# Patient Record
(demographics unavailable — no encounter records)

---

## 2025-06-10 NOTE — HISTORY OF PRESENT ILLNESS
[FreeTextEntry1] : annual physical, LBP, bruising, weight gain [de-identified] : LETI ROMO is a 39 year y/o F with PMH of uterine fibroids who presents as a new patient today to establish care. CC LBP, left side  #Low Back Pain Has seen sports medicine and back specialist Back specialist wanted to get an MRI but she had some other health things going on MRI authorization lapsed, needs a new one No sciatica, no weakness, numbness, or tingling Bothers her the most while laying down at night when changing positions Hard to lean forward especially when sitting for a while Started early 2023 Initially got XR on back, thinks she has access to it Last provider was spring 2024 and recommended PT after MRI results Would really like to know what is going on so that she can plan accordingly  #Weight Gain Pt has been steadily gaining weight Gets 10k+ steps per day Shares FHx of DM Resting weight was previously in 160s Has gained about 30 lbs over the past 2 years Turning 40 this year and wants to feel better in her body Is open to trying GLP-1  #Bruise on Left Thigh Has been there for a month Not painful Notes that mom bruises easily Sometimes bruises when bit by mosquitos Doesn't hurt Has not changed at all since it appeared Wants to know if it is something to be concerned about.    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

## 2025-06-10 NOTE — HEALTH RISK ASSESSMENT
[Yes] : Yes [2 - 3 times a week (3 pts)] : 2 - 3  times a week (3 points) [1 or 2 (0 pts)] : 1 or 2 (0 points) [Never (0 pts)] : Never (0 points) [0] : 2) Feeling down, depressed, or hopeless: Not at all (0) [PHQ-2 Negative - No further assessment needed] : PHQ-2 Negative - No further assessment needed [Patient reported PAP Smear was normal] : Patient reported PAP Smear was normal [HIV test declined] : HIV test declined [Never] : Never [NO] : No [PapSmearComments] : no h/o abn, follows with Gyn

## 2025-06-10 NOTE — HISTORY OF PRESENT ILLNESS
[FreeTextEntry1] : annual physical, LBP, bruising, weight gain [de-identified] : LETI ROMO is a 39 year y/o F with PMH of uterine fibroids who presents as a new patient today to establish care. CC LBP, left side  #Low Back Pain Has seen sports medicine and back specialist Back specialist wanted to get an MRI but she had some other health things going on MRI authorization lapsed, needs a new one No sciatica, no weakness, numbness, or tingling Bothers her the most while laying down at night when changing positions Hard to lean forward especially when sitting for a while Started early 2023 Initially got XR on back, thinks she has access to it Last provider was spring 2024 and recommended PT after MRI results Would really like to know what is going on so that she can plan accordingly  #Weight Gain Pt has been steadily gaining weight Gets 10k+ steps per day Shares FHx of DM Resting weight was previously in 160s Has gained about 30 lbs over the past 2 years Turning 40 this year and wants to feel better in her body Is open to trying GLP-1  #Bruise on Left Thigh Has been there for a month Not painful Notes that mom bruises easily Sometimes bruises when bit by mosquitos Doesn't hurt Has not changed at all since it appeared Wants to know if it is something to be concerned about.    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

## 2025-06-10 NOTE — PHYSICAL EXAM
[No Acute Distress] : no acute distress [Well-Appearing] : well-appearing [Non Tender] : non-tender [Non-distended] : non-distended [Normal Bowel Sounds] : normal bowel sounds [Coordination Grossly Intact] : coordination grossly intact [Normal Gait] : normal gait [Normal] : affect was normal and insight and judgment were intact [de-identified] : area of hyperpigmentation of L lateral thigh

## 2025-06-10 NOTE — ASSESSMENT
[Vaccines Reviewed] : Immunizations reviewed today. Please see immunization details in the vaccine log within the immunization flowsheet.  [FreeTextEntry1] : #LBP - Will order MRI after reviewing XR report - PT referral  #Hyperpigmentation - Derm referral  #BMI 32 #Weight Gain - F/u 1-2 weeks to discuss next steps  #HM Due for tdap UTD on pap Eligible for gardasil Does not need STI screening today Will do non-fasting bloodwork today Planning for IVF in the future - Bloodwork today - Vaccine appt for tdap - Get records - F/u 1 year, sooner if needed  I have spent 24 minutes of time on the encounter which excludes time spent of the preventive visit and separately reported services.

## 2025-06-10 NOTE — HEALTH RISK ASSESSMENT
[Yes] : Yes [1 or 2 (0 pts)] : 1 or 2 (0 points) [2 - 3 times a week (3 pts)] : 2 - 3  times a week (3 points) [Never (0 pts)] : Never (0 points) [0] : 2) Feeling down, depressed, or hopeless: Not at all (0) [PHQ-2 Negative - No further assessment needed] : PHQ-2 Negative - No further assessment needed [Patient reported PAP Smear was normal] : Patient reported PAP Smear was normal [HIV test declined] : HIV test declined [Never] : Never [NO] : No [PapSmearComments] : no h/o abn, follows with Gyn

## 2025-06-10 NOTE — PHYSICAL EXAM
[No Acute Distress] : no acute distress [Well-Appearing] : well-appearing [Non-distended] : non-distended [Non Tender] : non-tender [Normal Bowel Sounds] : normal bowel sounds [Normal Gait] : normal gait [Coordination Grossly Intact] : coordination grossly intact [Normal] : affect was normal and insight and judgment were intact [de-identified] : area of hyperpigmentation of L lateral thigh

## 2025-06-24 NOTE — HISTORY OF PRESENT ILLNESS
[FreeTextEntry1] : results review, abn CBC, elevated BP, back pain. discuss GLP-1 [de-identified] : LETI ROMO is a 39 year y/o F who presents for follow-up today. CC results review, abn CBC, elevated BP, back pain. discuss GLP-1  #BMI 32 #HLD Has friends who have benefited from GLP-1 medications and has seen it improve their quality of life.  Pt has no personal or family history of pancreatitis, MEN2, or medullary thyroid carcinoma. We discussed side effects, doses, and timeline, as well as the possibility of this being a long-term medication. Has fertility appt upcoming, will discuss GLP-1 with egg freezing  #Low Platelets We reviewed CBC and discuss next steps, will plan to Recheck CBC  #Elevated BP We discussed proper BP measurement and home measurement if BP remains elevated  #Back Pain will order MRI with XR results

## 2025-06-24 NOTE — PHYSICAL EXAM
[No Acute Distress] : no acute distress [Well-Appearing] : well-appearing [Normal Sclera/Conjunctiva] : normal sclera/conjunctiva [EOMI] : extraocular movements intact [Coordination Grossly Intact] : coordination grossly intact [Normal Gait] : normal gait [Speech Grossly Normal] : speech grossly normal [Normal Mood] : the mood was normal [Normal] : affect was normal and insight and judgment were intact [Normal Outer Ear/Nose] : the outer ears and nose were normal in appearance [No JVD] : no jugular venous distention [No Respiratory Distress] : no respiratory distress  [No Accessory Muscle Use] : no accessory muscle use

## 2025-06-24 NOTE — ASSESSMENT
[FreeTextEntry1] : #BMI 32 #HLD Pt has no personal or family history of pancreatitis, MEN2, or medullary thyroid carcinoma. We discussed side effects, doses, and timeline, as well as the possibility of this being a long-term medication. All questions answered - Start Zepbound 2.5 mg subQ weekly x 4 weeks - Dietician referral - F/u with Fertility provider about GLP-1 and egg retrieval - Continue healthy eating including adequate protein intake - Continue exercise to help build and maintain muscle mass - F/u in 4 weeks to discuss dose increase  #Abn CBC We reviewed bloodwork and discussed repeat CBC prior to further w/u - Recheck CBC  #Elevated BP Elevated BP on arrival today, notably the hottest day of the year so far and patient is drinking lots of water and had just arrived, BP checked without back support. We reviewed proper BP measurement positioning and technique and discussed the possibility of home BP measurement. 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

## 2025-06-24 NOTE — HISTORY OF PRESENT ILLNESS
[FreeTextEntry1] : results review, abn CBC, elevated BP, back pain. discuss GLP-1 [de-identified] : LETI ROMO is a 39 year y/o F who presents for follow-up today. CC results review, abn CBC, elevated BP, back pain. discuss GLP-1  #BMI 32 #HLD Has friends who have benefited from GLP-1 medications and has seen it improve their quality of life.  Pt has no personal or family history of pancreatitis, MEN2, or medullary thyroid carcinoma. We discussed side effects, doses, and timeline, as well as the possibility of this being a long-term medication. Has fertility appt upcoming, will discuss GLP-1 with egg freezing  #Low Platelets We reviewed CBC and discuss next steps, will plan to Recheck CBC  #Elevated BP We discussed proper BP measurement and home measurement if BP remains elevated  #Back Pain will order MRI with XR results

## 2025-07-23 NOTE — ASSESSMENT
[FreeTextEntry1] : #BMI 32 #HLD Started Zepbound at 2.5 mg subQ weekly last month Notes some manageable side effects which have improved with each dose, focusing on staying hydrated, remaining active, and meeting her protein goals. Notes 7 lb weight loss, decreased appetite, overall doing well. We discussed dose modifications, pt would like to remain at the current dose given her excellent response. - Continue Zepbound 2.5 mg subQ weekly x 4 weeks - Pt to seek out non-[facility] dietician more locally - Continue healthy eating including adequate protein intake - Continue exercise to help build and maintain muscle mass - F/u in 4 weeks to reassess dose  #Back Pain MRI approved, pt has not yet scheduled. We discussed that pending results, will refer appropriately - [facility] imaging information provided  I have spent 25 minutes of time on the encounter which excludes teaching and/or separately reported services.

## 2025-07-23 NOTE — PHYSICAL EXAM
[No Acute Distress] : no acute distress [Well-Appearing] : well-appearing [Normal Voice/Communication] : normal voice/communication [Normal Sclera/Conjunctiva] : normal sclera/conjunctiva [EOMI] : extraocular movements intact [Normal Outer Ear/Nose] : the outer ears and nose were normal in appearance [No Respiratory Distress] : no respiratory distress  [No Rash] : no rash [Coordination Grossly Intact] : coordination grossly intact [Speech Grossly Normal] : speech grossly normal [Memory Grossly Normal] : memory grossly normal [Alert and Oriented x3] : oriented to person, place, and time [Normal Mood] : the mood was normal [Normal] : affect was normal and insight and judgment were intact

## 2025-07-23 NOTE — HISTORY OF PRESENT ILLNESS
[de-identified] : LETI ROMO is a 39 year y/o F who presents for follow-up today. CC weight loss medication management Pt consents to televisit with two-way audio and video. Pt confirms that they are currently located in the [facility], at [address] [place] [address]. Provider located at [address] [place] [address].  #BMI 32 #HLD Started Zepbound at 2.5 mg subQ weekly last month Notes some of nausea side effects of nausea Hitting her protein goals, staying hydrated, staying active Plans to weigh in tomorrow We discussed dose modifications, pt would like to remain at this dose. Notes 7 weight loss, decreased appetite, overall losing inches when she is measuring herself. Was nervous the first week but feels as though it is easy to administer  #Back Pain Got insurance approval for MRI Discussed follow-up